# Patient Record
Sex: FEMALE | Race: WHITE | Employment: OTHER | URBAN - METROPOLITAN AREA
[De-identification: names, ages, dates, MRNs, and addresses within clinical notes are randomized per-mention and may not be internally consistent; named-entity substitution may affect disease eponyms.]

---

## 2017-02-24 PROCEDURE — 82746 ASSAY OF FOLIC ACID SERUM: CPT | Performed by: FAMILY MEDICINE

## 2017-02-24 PROCEDURE — 82607 VITAMIN B-12: CPT | Performed by: FAMILY MEDICINE

## 2017-04-13 ENCOUNTER — LAB ENCOUNTER (OUTPATIENT)
Dept: LAB | Age: 82
End: 2017-04-13
Attending: INTERNAL MEDICINE
Payer: MEDICARE

## 2017-04-13 DIAGNOSIS — K74.60 CIRRHOSIS (HCC): Primary | ICD-10-CM

## 2017-04-13 PROCEDURE — 82105 ALPHA-FETOPROTEIN SERUM: CPT

## 2017-06-13 PROCEDURE — 87086 URINE CULTURE/COLONY COUNT: CPT | Performed by: FAMILY MEDICINE

## 2017-06-16 PROCEDURE — 87081 CULTURE SCREEN ONLY: CPT | Performed by: FAMILY MEDICINE

## 2017-07-05 ENCOUNTER — LAB ENCOUNTER (OUTPATIENT)
Dept: LAB | Age: 82
End: 2017-07-05
Attending: ORTHOPAEDIC SURGERY
Payer: MEDICARE

## 2017-07-05 DIAGNOSIS — Z01.818 PRE-OP TESTING: ICD-10-CM

## 2017-07-05 LAB
ANTIBODY SCREEN: NEGATIVE
RH BLOOD TYPE: POSITIVE

## 2017-07-05 PROCEDURE — 86901 BLOOD TYPING SEROLOGIC RH(D): CPT

## 2017-07-05 PROCEDURE — 86900 BLOOD TYPING SEROLOGIC ABO: CPT

## 2017-07-05 PROCEDURE — 36415 COLL VENOUS BLD VENIPUNCTURE: CPT

## 2017-07-05 PROCEDURE — 86850 RBC ANTIBODY SCREEN: CPT

## 2017-07-10 ENCOUNTER — ANESTHESIA (OUTPATIENT)
Dept: SURGERY | Facility: HOSPITAL | Age: 82
DRG: 470 | End: 2017-07-10
Payer: MEDICARE

## 2017-07-10 ENCOUNTER — APPOINTMENT (OUTPATIENT)
Dept: GENERAL RADIOLOGY | Facility: HOSPITAL | Age: 82
DRG: 470 | End: 2017-07-10
Attending: ORTHOPAEDIC SURGERY
Payer: MEDICARE

## 2017-07-10 ENCOUNTER — HOSPITAL ENCOUNTER (INPATIENT)
Facility: HOSPITAL | Age: 82
LOS: 2 days | Discharge: NURSING, REHAB OR LONG-TERM CARE | DRG: 470 | End: 2017-07-12
Attending: ORTHOPAEDIC SURGERY | Admitting: ORTHOPAEDIC SURGERY
Payer: MEDICARE

## 2017-07-10 ENCOUNTER — SURGERY (OUTPATIENT)
Age: 82
End: 2017-07-10

## 2017-07-10 ENCOUNTER — ANESTHESIA EVENT (OUTPATIENT)
Dept: SURGERY | Facility: HOSPITAL | Age: 82
DRG: 470 | End: 2017-07-10
Payer: MEDICARE

## 2017-07-10 DIAGNOSIS — M17.12 PRIMARY OSTEOARTHRITIS OF LEFT KNEE: ICD-10-CM

## 2017-07-10 DIAGNOSIS — Z01.818 PRE-OP TESTING: Primary | ICD-10-CM

## 2017-07-10 PROCEDURE — 97530 THERAPEUTIC ACTIVITIES: CPT

## 2017-07-10 PROCEDURE — 88311 DECALCIFY TISSUE: CPT | Performed by: ORTHOPAEDIC SURGERY

## 2017-07-10 PROCEDURE — C9290 INJ, BUPIVACAINE LIPOSOME: HCPCS | Performed by: ORTHOPAEDIC SURGERY

## 2017-07-10 PROCEDURE — 0SRD0J9 REPLACEMENT OF LEFT KNEE JOINT WITH SYNTHETIC SUBSTITUTE, CEMENTED, OPEN APPROACH: ICD-10-PCS | Performed by: ORTHOPAEDIC SURGERY

## 2017-07-10 PROCEDURE — 97162 PT EVAL MOD COMPLEX 30 MIN: CPT

## 2017-07-10 PROCEDURE — 73560 X-RAY EXAM OF KNEE 1 OR 2: CPT | Performed by: ORTHOPAEDIC SURGERY

## 2017-07-10 PROCEDURE — 88305 TISSUE EXAM BY PATHOLOGIST: CPT | Performed by: ORTHOPAEDIC SURGERY

## 2017-07-10 DEVICE — P.F.C. SIGMA OVAL DOME PATELLA 3-PEG 38MM CEMENTED
Type: IMPLANTABLE DEVICE | Site: KNEE | Status: FUNCTIONAL
Brand: P.F.C. SIGMA

## 2017-07-10 DEVICE — P.F.C. SIGMA TIBIAL TRAY FIXED BEARING MODULAR COCR 2.5 CEMENTED
Type: IMPLANTABLE DEVICE | Site: KNEE | Status: FUNCTIONAL
Brand: P.F.C. SIGMA

## 2017-07-10 DEVICE — DEPUY CMW 1 HIGH VISCOSITY BONE CEMENT 40G: Type: IMPLANTABLE DEVICE | Site: KNEE | Status: FUNCTIONAL

## 2017-07-10 RX ORDER — HYDROMORPHONE HYDROCHLORIDE 1 MG/ML
0.4 INJECTION, SOLUTION INTRAMUSCULAR; INTRAVENOUS; SUBCUTANEOUS EVERY 2 HOUR PRN
Status: DISCONTINUED | OUTPATIENT
Start: 2017-07-10 | End: 2017-07-12

## 2017-07-10 RX ORDER — SODIUM CHLORIDE, SODIUM LACTATE, POTASSIUM CHLORIDE, CALCIUM CHLORIDE 600; 310; 30; 20 MG/100ML; MG/100ML; MG/100ML; MG/100ML
INJECTION, SOLUTION INTRAVENOUS CONTINUOUS
Status: DISCONTINUED | OUTPATIENT
Start: 2017-07-10 | End: 2017-07-12

## 2017-07-10 RX ORDER — HYDROMORPHONE HYDROCHLORIDE 1 MG/ML
0.4 INJECTION, SOLUTION INTRAMUSCULAR; INTRAVENOUS; SUBCUTANEOUS EVERY 5 MIN PRN
Status: DISCONTINUED | OUTPATIENT
Start: 2017-07-10 | End: 2017-07-10 | Stop reason: HOSPADM

## 2017-07-10 RX ORDER — HYDROMORPHONE HYDROCHLORIDE 1 MG/ML
0.6 INJECTION, SOLUTION INTRAMUSCULAR; INTRAVENOUS; SUBCUTANEOUS EVERY 5 MIN PRN
Status: DISCONTINUED | OUTPATIENT
Start: 2017-07-10 | End: 2017-07-10 | Stop reason: HOSPADM

## 2017-07-10 RX ORDER — SODIUM CHLORIDE 9 MG/ML
INJECTION, SOLUTION INTRAVENOUS CONTINUOUS
Status: DISCONTINUED | OUTPATIENT
Start: 2017-07-10 | End: 2017-07-12

## 2017-07-10 RX ORDER — HYDROMORPHONE HYDROCHLORIDE 1 MG/ML
0.8 INJECTION, SOLUTION INTRAMUSCULAR; INTRAVENOUS; SUBCUTANEOUS EVERY 2 HOUR PRN
Status: DISCONTINUED | OUTPATIENT
Start: 2017-07-10 | End: 2017-07-12

## 2017-07-10 RX ORDER — ONDANSETRON 2 MG/ML
4 INJECTION INTRAMUSCULAR; INTRAVENOUS ONCE AS NEEDED
Status: DISCONTINUED | OUTPATIENT
Start: 2017-07-10 | End: 2017-07-10 | Stop reason: HOSPADM

## 2017-07-10 RX ORDER — MULTIPLE VITAMINS W/ MINERALS TAB 9MG-400MCG
1 TAB ORAL DAILY
Status: DISCONTINUED | OUTPATIENT
Start: 2017-07-10 | End: 2017-07-12

## 2017-07-10 RX ORDER — METOCLOPRAMIDE HYDROCHLORIDE 5 MG/ML
10 INJECTION INTRAMUSCULAR; INTRAVENOUS EVERY 6 HOURS PRN
Status: ACTIVE | OUTPATIENT
Start: 2017-07-10 | End: 2017-07-12

## 2017-07-10 RX ORDER — DIPHENHYDRAMINE HYDROCHLORIDE 50 MG/ML
25 INJECTION INTRAMUSCULAR; INTRAVENOUS ONCE AS NEEDED
Status: ACTIVE | OUTPATIENT
Start: 2017-07-10 | End: 2017-07-10

## 2017-07-10 RX ORDER — ROCURONIUM BROMIDE 10 MG/ML
INJECTION, SOLUTION INTRAVENOUS AS NEEDED
Status: DISCONTINUED | OUTPATIENT
Start: 2017-07-10 | End: 2017-07-10 | Stop reason: SURG

## 2017-07-10 RX ORDER — HYDROMORPHONE HYDROCHLORIDE 1 MG/ML
INJECTION, SOLUTION INTRAMUSCULAR; INTRAVENOUS; SUBCUTANEOUS AS NEEDED
Status: DISCONTINUED | OUTPATIENT
Start: 2017-07-10 | End: 2017-07-10 | Stop reason: SURG

## 2017-07-10 RX ORDER — HYDROCODONE BITARTRATE AND ACETAMINOPHEN 7.5; 325 MG/1; MG/1
2 TABLET ORAL EVERY 4 HOURS PRN
Status: DISCONTINUED | OUTPATIENT
Start: 2017-07-10 | End: 2017-07-12

## 2017-07-10 RX ORDER — METOCLOPRAMIDE 10 MG/1
10 TABLET ORAL ONCE
Status: DISCONTINUED | OUTPATIENT
Start: 2017-07-10 | End: 2017-07-10 | Stop reason: HOSPADM

## 2017-07-10 RX ORDER — SODIUM CHLORIDE 0.9 % (FLUSH) 0.9 %
10 SYRINGE (ML) INJECTION AS NEEDED
Status: DISCONTINUED | OUTPATIENT
Start: 2017-07-10 | End: 2017-07-12

## 2017-07-10 RX ORDER — NEOSTIGMINE METHYLSULFATE 0.5 MG/ML
INJECTION INTRAVENOUS AS NEEDED
Status: DISCONTINUED | OUTPATIENT
Start: 2017-07-10 | End: 2017-07-10 | Stop reason: SURG

## 2017-07-10 RX ORDER — FAMOTIDINE 20 MG/1
20 TABLET ORAL ONCE
Status: DISCONTINUED | OUTPATIENT
Start: 2017-07-10 | End: 2017-07-10 | Stop reason: HOSPADM

## 2017-07-10 RX ORDER — POLYETHYLENE GLYCOL 3350 17 G/17G
17 POWDER, FOR SOLUTION ORAL DAILY PRN
Status: DISCONTINUED | OUTPATIENT
Start: 2017-07-10 | End: 2017-07-12

## 2017-07-10 RX ORDER — SCOLOPAMINE TRANSDERMAL SYSTEM 1 MG/1
1 PATCH, EXTENDED RELEASE TRANSDERMAL ONCE
Status: DISCONTINUED | OUTPATIENT
Start: 2017-07-10 | End: 2017-07-12

## 2017-07-10 RX ORDER — SODIUM PHOSPHATE, DIBASIC AND SODIUM PHOSPHATE, MONOBASIC 7; 19 G/133ML; G/133ML
1 ENEMA RECTAL ONCE AS NEEDED
Status: DISCONTINUED | OUTPATIENT
Start: 2017-07-10 | End: 2017-07-12

## 2017-07-10 RX ORDER — DOCUSATE SODIUM 100 MG/1
100 CAPSULE, LIQUID FILLED ORAL 2 TIMES DAILY
Status: DISCONTINUED | OUTPATIENT
Start: 2017-07-10 | End: 2017-07-12

## 2017-07-10 RX ORDER — FAMOTIDINE 20 MG/1
20 TABLET ORAL 2 TIMES DAILY
Status: DISCONTINUED | OUTPATIENT
Start: 2017-07-10 | End: 2017-07-12

## 2017-07-10 RX ORDER — HYDROMORPHONE HYDROCHLORIDE 1 MG/ML
0.2 INJECTION, SOLUTION INTRAMUSCULAR; INTRAVENOUS; SUBCUTANEOUS EVERY 5 MIN PRN
Status: DISCONTINUED | OUTPATIENT
Start: 2017-07-10 | End: 2017-07-10 | Stop reason: HOSPADM

## 2017-07-10 RX ORDER — HYDROCODONE BITARTRATE AND ACETAMINOPHEN 5; 325 MG/1; MG/1
1 TABLET ORAL AS NEEDED
Status: DISCONTINUED | OUTPATIENT
Start: 2017-07-10 | End: 2017-07-10 | Stop reason: HOSPADM

## 2017-07-10 RX ORDER — ACETAMINOPHEN 325 MG/1
650 TABLET ORAL ONCE
Status: DISCONTINUED | OUTPATIENT
Start: 2017-07-10 | End: 2017-07-10

## 2017-07-10 RX ORDER — ONDANSETRON 2 MG/ML
4 INJECTION INTRAMUSCULAR; INTRAVENOUS EVERY 4 HOURS PRN
Status: DISCONTINUED | OUTPATIENT
Start: 2017-07-10 | End: 2017-07-12

## 2017-07-10 RX ORDER — DIPHENHYDRAMINE HYDROCHLORIDE 50 MG/ML
12.5 INJECTION INTRAMUSCULAR; INTRAVENOUS EVERY 4 HOURS PRN
Status: DISCONTINUED | OUTPATIENT
Start: 2017-07-10 | End: 2017-07-12

## 2017-07-10 RX ORDER — METOPROLOL SUCCINATE 25 MG/1
25 TABLET, EXTENDED RELEASE ORAL DAILY
Status: DISCONTINUED | OUTPATIENT
Start: 2017-07-10 | End: 2017-07-12

## 2017-07-10 RX ORDER — CYCLOBENZAPRINE HCL 10 MG
10 TABLET ORAL EVERY 8 HOURS PRN
Status: DISCONTINUED | OUTPATIENT
Start: 2017-07-10 | End: 2017-07-12

## 2017-07-10 RX ORDER — HYDROCODONE BITARTRATE AND ACETAMINOPHEN 7.5; 325 MG/1; MG/1
1 TABLET ORAL EVERY 4 HOURS PRN
Status: DISCONTINUED | OUTPATIENT
Start: 2017-07-10 | End: 2017-07-12

## 2017-07-10 RX ORDER — ACETAMINOPHEN 500 MG
1000 TABLET ORAL ONCE
Status: COMPLETED | OUTPATIENT
Start: 2017-07-10 | End: 2017-07-10

## 2017-07-10 RX ORDER — BISACODYL 10 MG
10 SUPPOSITORY, RECTAL RECTAL
Status: DISCONTINUED | OUTPATIENT
Start: 2017-07-10 | End: 2017-07-12

## 2017-07-10 RX ORDER — ACETAMINOPHEN 325 MG/1
650 TABLET ORAL EVERY 4 HOURS PRN
Status: DISCONTINUED | OUTPATIENT
Start: 2017-07-10 | End: 2017-07-12

## 2017-07-10 RX ORDER — HALOPERIDOL 5 MG/ML
0.25 INJECTION INTRAMUSCULAR ONCE AS NEEDED
Status: DISCONTINUED | OUTPATIENT
Start: 2017-07-10 | End: 2017-07-10 | Stop reason: HOSPADM

## 2017-07-10 RX ORDER — OYSTER SHELL CALCIUM WITH VITAMIN D 500; 200 MG/1; [IU]/1
2 TABLET, FILM COATED ORAL DAILY
Status: DISCONTINUED | OUTPATIENT
Start: 2017-07-10 | End: 2017-07-12

## 2017-07-10 RX ORDER — FAMOTIDINE 10 MG/ML
20 INJECTION, SOLUTION INTRAVENOUS 2 TIMES DAILY
Status: DISCONTINUED | OUTPATIENT
Start: 2017-07-10 | End: 2017-07-12

## 2017-07-10 RX ORDER — DIPHENHYDRAMINE HCL 25 MG
25 CAPSULE ORAL EVERY 4 HOURS PRN
Status: DISCONTINUED | OUTPATIENT
Start: 2017-07-10 | End: 2017-07-12

## 2017-07-10 RX ORDER — HYDROCODONE BITARTRATE AND ACETAMINOPHEN 5; 325 MG/1; MG/1
2 TABLET ORAL AS NEEDED
Status: DISCONTINUED | OUTPATIENT
Start: 2017-07-10 | End: 2017-07-10 | Stop reason: HOSPADM

## 2017-07-10 RX ORDER — METOPROLOL TARTRATE 5 MG/5ML
2.5 INJECTION INTRAVENOUS ONCE
Status: DISCONTINUED | OUTPATIENT
Start: 2017-07-10 | End: 2017-07-10 | Stop reason: HOSPADM

## 2017-07-10 RX ORDER — MAGNESIUM HYDROXIDE 1200 MG/15ML
LIQUID ORAL CONTINUOUS PRN
Status: DISCONTINUED | OUTPATIENT
Start: 2017-07-10 | End: 2017-07-10

## 2017-07-10 RX ORDER — DEXAMETHASONE SODIUM PHOSPHATE 4 MG/ML
VIAL (ML) INJECTION AS NEEDED
Status: DISCONTINUED | OUTPATIENT
Start: 2017-07-10 | End: 2017-07-10 | Stop reason: SURG

## 2017-07-10 RX ORDER — HYDROMORPHONE HYDROCHLORIDE 1 MG/ML
0.2 INJECTION, SOLUTION INTRAMUSCULAR; INTRAVENOUS; SUBCUTANEOUS EVERY 2 HOUR PRN
Status: DISCONTINUED | OUTPATIENT
Start: 2017-07-10 | End: 2017-07-12

## 2017-07-10 RX ORDER — SODIUM CHLORIDE, SODIUM LACTATE, POTASSIUM CHLORIDE, CALCIUM CHLORIDE 600; 310; 30; 20 MG/100ML; MG/100ML; MG/100ML; MG/100ML
INJECTION, SOLUTION INTRAVENOUS CONTINUOUS PRN
Status: DISCONTINUED | OUTPATIENT
Start: 2017-07-10 | End: 2017-07-10 | Stop reason: SURG

## 2017-07-10 RX ORDER — MORPHINE SULFATE 2 MG/ML
2 INJECTION, SOLUTION INTRAMUSCULAR; INTRAVENOUS EVERY 10 MIN PRN
Status: DISCONTINUED | OUTPATIENT
Start: 2017-07-10 | End: 2017-07-10 | Stop reason: HOSPADM

## 2017-07-10 RX ORDER — ASPIRIN 325 MG
325 TABLET ORAL DAILY
Status: DISCONTINUED | OUTPATIENT
Start: 2017-07-11 | End: 2017-07-12

## 2017-07-10 RX ORDER — ONDANSETRON 2 MG/ML
INJECTION INTRAMUSCULAR; INTRAVENOUS AS NEEDED
Status: DISCONTINUED | OUTPATIENT
Start: 2017-07-10 | End: 2017-07-10 | Stop reason: SURG

## 2017-07-10 RX ORDER — MORPHINE SULFATE 4 MG/ML
6 INJECTION, SOLUTION INTRAMUSCULAR; INTRAVENOUS EVERY 10 MIN PRN
Status: DISCONTINUED | OUTPATIENT
Start: 2017-07-10 | End: 2017-07-10 | Stop reason: HOSPADM

## 2017-07-10 RX ORDER — NALOXONE HYDROCHLORIDE 0.4 MG/ML
80 INJECTION, SOLUTION INTRAMUSCULAR; INTRAVENOUS; SUBCUTANEOUS AS NEEDED
Status: DISCONTINUED | OUTPATIENT
Start: 2017-07-10 | End: 2017-07-10 | Stop reason: HOSPADM

## 2017-07-10 RX ORDER — MORPHINE SULFATE 4 MG/ML
4 INJECTION, SOLUTION INTRAMUSCULAR; INTRAVENOUS EVERY 10 MIN PRN
Status: DISCONTINUED | OUTPATIENT
Start: 2017-07-10 | End: 2017-07-10 | Stop reason: HOSPADM

## 2017-07-10 RX ORDER — GABAPENTIN 300 MG/1
600 CAPSULE ORAL ONCE
Status: COMPLETED | OUTPATIENT
Start: 2017-07-10 | End: 2017-07-10

## 2017-07-10 RX ORDER — GLYCOPYRROLATE 0.2 MG/ML
INJECTION INTRAMUSCULAR; INTRAVENOUS AS NEEDED
Status: DISCONTINUED | OUTPATIENT
Start: 2017-07-10 | End: 2017-07-10 | Stop reason: SURG

## 2017-07-10 RX ORDER — LIDOCAINE HYDROCHLORIDE 10 MG/ML
INJECTION, SOLUTION EPIDURAL; INFILTRATION; INTRACAUDAL; PERINEURAL AS NEEDED
Status: DISCONTINUED | OUTPATIENT
Start: 2017-07-10 | End: 2017-07-10 | Stop reason: SURG

## 2017-07-10 RX ADMIN — LIDOCAINE HYDROCHLORIDE 40 MG: 10 INJECTION, SOLUTION EPIDURAL; INFILTRATION; INTRACAUDAL; PERINEURAL at 09:48:00

## 2017-07-10 RX ADMIN — GLYCOPYRROLATE 0.5 MG: 0.2 INJECTION INTRAMUSCULAR; INTRAVENOUS at 11:15:00

## 2017-07-10 RX ADMIN — SODIUM CHLORIDE, SODIUM LACTATE, POTASSIUM CHLORIDE, CALCIUM CHLORIDE: 600; 310; 30; 20 INJECTION, SOLUTION INTRAVENOUS at 09:45:00

## 2017-07-10 RX ADMIN — NEOSTIGMINE METHYLSULFATE 3 MG: 0.5 INJECTION INTRAVENOUS at 11:15:00

## 2017-07-10 RX ADMIN — HYDROMORPHONE HYDROCHLORIDE 0.2 MG: 1 INJECTION, SOLUTION INTRAMUSCULAR; INTRAVENOUS; SUBCUTANEOUS at 10:38:00

## 2017-07-10 RX ADMIN — ROCURONIUM BROMIDE 35 MG: 10 INJECTION, SOLUTION INTRAVENOUS at 09:49:00

## 2017-07-10 RX ADMIN — DEXAMETHASONE SODIUM PHOSPHATE 4 MG: 4 MG/ML VIAL (ML) INJECTION at 10:03:00

## 2017-07-10 RX ADMIN — HYDROMORPHONE HYDROCHLORIDE 0.2 MG: 1 INJECTION, SOLUTION INTRAMUSCULAR; INTRAVENOUS; SUBCUTANEOUS at 10:28:00

## 2017-07-10 RX ADMIN — ONDANSETRON 4 MG: 2 INJECTION INTRAMUSCULAR; INTRAVENOUS at 10:54:00

## 2017-07-10 RX ADMIN — SODIUM CHLORIDE, SODIUM LACTATE, POTASSIUM CHLORIDE, CALCIUM CHLORIDE: 600; 310; 30; 20 INJECTION, SOLUTION INTRAVENOUS at 11:21:00

## 2017-07-10 RX ADMIN — HYDROMORPHONE HYDROCHLORIDE 0.4 MG: 1 INJECTION, SOLUTION INTRAMUSCULAR; INTRAVENOUS; SUBCUTANEOUS at 09:59:00

## 2017-07-10 RX ADMIN — HYDROMORPHONE HYDROCHLORIDE 0.2 MG: 1 INJECTION, SOLUTION INTRAMUSCULAR; INTRAVENOUS; SUBCUTANEOUS at 11:18:00

## 2017-07-10 NOTE — INTERVAL H&P NOTE
Pre-op Diagnosis: Left knee osteoarthritis    The above referenced H&P was reviewed by Eladio Pacheco MD on 7/10/2017, the patient was examined and no significant changes have occurred in the patient's condition since the H&P was performed.   I discussed with

## 2017-07-10 NOTE — H&P
[address] [facility] [address] Patient Status:  Surgery Admit    1935 MRN [id number]   Location [place] [address] Attending Gardenia Lai MD   Hosp Day # 0 PCP Sherryle Rhein, DO     Subjective:  Patient is admitted for left total kn [facility])     left leg 2011   • GERD without esophagitis    • History of blood transfusion     anemia    • Hypercholesterolemia    • Hypertension    • Iron deficiency anemia    • Lumbar stenosis    • Osteoarthritis involving multiple joints on both sides of luis and imaging studies are consistent with advanced degenerative joint disease of the left knee. The treatment options including medical management, injection therapy arthroscopy and arthroplasty were discussed at length.  The risks and benefits of total knee

## 2017-07-10 NOTE — OPERATIVE REPORT
Date of Surgery:  07/10/2017  Surgeon: Margarita Valderrama MD  Anesthesia Type:  General  Pre-Op Diagnosis:     (1) Unilateral primary osteoarthritis, left knee  Post-Op Diagnosis:     (1) Unilateral primary osteoarthritis, left knee  Procedure Performed  left TKA cutting guide, drilled it and pinned it in place and then resected the distal femur, sizing it to a 2.5. We then placed the four-in-one cutting guide and made our anterior cut, posterior cut, chamfer cut. We then turned our attention to the tibia.   The nusrat 25

## 2017-07-10 NOTE — ANESTHESIA POSTPROCEDURE EVALUATION
Patient: Steve Jerez    Procedure Summary     Date:  07/10/17 Room / Location:  [address] [place] [address] MAIN OR 11 / [address] [place] [address] MAIN OR    Anesthesia Start:  0945 Anesthesia Stop:  7121    Procedure:  KNEE TOTAL REPLACEMENT (Left Knee) Diagnosis:  (Left knee osteoarthritis)    Surg

## 2017-07-10 NOTE — ANESTHESIA PREPROCEDURE EVALUATION
Anesthesia PreOp Note    HPI:     Chantal Mortimer is a 80year old female who presents for preoperative consultation requested by: Willie Gillespie MD    Date of Surgery: 7/10/2017    Procedure(s):  KNEE TOTAL REPLACEMENT  Indication: Left knee osteoarthritis JOINT UNLISTED      Comment: left knee  2001: CATARACT Bilateral  2001: CATARACT EXTRACTION W/ INTRAOCULAR LENS  IMPLA*  No date: HYSTERECTOMY  2008: RELIEVE BLADDER RETENTION      Comment: bladder suspension  2003: REPAIR RETINAL DETACH W VITRECTOMY - OD MG/ML 30 mg, morphINE Sulfate (PF) 5 mg in sodium chloride 0.9 % syringe  Intra-articular Once Nolvia Dejesus MD      No current [facility]-ordered outpatient prescriptions on file.       Codeine                 Dizziness  Niacin                  Other (See Comment dentures and lower dentures    Pulmonary - negative ROS and normal exam   Cardiovascular - normal exam  (+) hypertension,     Neuro/Psych - negative ROS     GI/Hepatic/Renal    (+) GERD, liver disease,     Endo/Other    Abdominal  - normal exam

## 2017-07-10 NOTE — DISCHARGE PLANNING
EMILIE met with the pt. And her children At bedside. The pt. Lives alone at [facility]. The pt. Reports being independent prior to admission with adls and ambulation. The pt. Goes to the dining room for her meals. The pt.  Has 4 children,

## 2017-07-10 NOTE — PLAN OF CARE
PT STATED THAT HAS HX GASTRIC BLEED WITH ELIQUIS. DR. Margo Velez CONTACTED AND KWADWOQULANRE DC'D AND PT WILL BE ON ASA BID.

## 2017-07-10 NOTE — PHYSICAL THERAPY NOTE
PHYSICAL THERAPY KNEE EVALUATION - INPATIENT     Room Number: 424/424-A  Evaluation Date: 7/10/2017  Type of Evaluation: Initial  Physician Order: PT Eval and Treat    Presenting Problem: L TKA  Reason for Therapy: Mobility Dysfunction and Discharge [place] mobility;Transfer training;Gait training;Strengthening;Patient education; Family education  Rehab Potential : Fair  Frequency (Obs): BID       PHYSICAL THERAPY MEDICAL/SOCIAL HISTORY     History related to current admission: Per H&P: \"Patient is admitted f Drives: No (Son or daughter drives)          Prior Level of [place]: Pt was independent w/ ADLs and amb w/o AD. Pt down to dining room for meals.      SUBJECTIVE  \"Oh Mommy!!\"    PHYSICAL THERAPY EXAMINATION     OBJECTIVE     Fall Risk: High fall r pumps  Transfer training    Patient End of Session: Up in chair;Needs met;Call light within reach;RN aware of session/findings; All patient questions and concerns addressed; Family present    CURRENT GOALS    Goals to be met by: 07/24/17  Patient Goal Jg

## 2017-07-11 LAB
ANION GAP SERPL CALC-SCNC: 5 MMOL/L (ref 0–18)
BUN SERPL-MCNC: 28 MG/DL (ref 8–20)
BUN/CREAT SERPL: 34.1 (ref 10–20)
CALCIUM SERPL-MCNC: 8.2 MG/DL (ref 8.5–10.5)
CHLORIDE SERPL-SCNC: 107 MMOL/L (ref 95–110)
CO2 SERPL-SCNC: 26 MMOL/L (ref 22–32)
CREAT SERPL-MCNC: 0.82 MG/DL (ref 0.5–1.5)
ERYTHROCYTE [DISTWIDTH] IN BLOOD BY AUTOMATED COUNT: 13.3 % (ref 11–15)
GLUCOSE SERPL-MCNC: 112 MG/DL (ref 70–99)
HCT VFR BLD AUTO: 30.4 % (ref 35–48)
HGB BLD-MCNC: 10.4 G/DL (ref 12–16)
MCH RBC QN AUTO: 32.6 PG (ref 27–32)
MCHC RBC AUTO-ENTMCNC: 34.2 G/DL (ref 32–37)
MCV RBC AUTO: 95.3 FL (ref 80–100)
OSMOLALITY UR CALC.SUM OF ELEC: 292 MOSM/KG (ref 275–295)
PLATELET # BLD AUTO: 169 K/UL (ref 140–400)
PMV BLD AUTO: 8.5 FL (ref 7.4–10.3)
POTASSIUM SERPL-SCNC: 4.8 MMOL/L (ref 3.3–5.1)
RBC # BLD AUTO: 3.19 M/UL (ref 3.7–5.4)
SODIUM SERPL-SCNC: 138 MMOL/L (ref 136–144)
WBC # BLD AUTO: 10 K/UL (ref 4–11)

## 2017-07-11 PROCEDURE — 97165 OT EVAL LOW COMPLEX 30 MIN: CPT

## 2017-07-11 PROCEDURE — 97535 SELF CARE MNGMENT TRAINING: CPT

## 2017-07-11 PROCEDURE — 85027 COMPLETE CBC AUTOMATED: CPT | Performed by: ORTHOPAEDIC SURGERY

## 2017-07-11 PROCEDURE — 80048 BASIC METABOLIC PNL TOTAL CA: CPT | Performed by: ORTHOPAEDIC SURGERY

## 2017-07-11 PROCEDURE — 97116 GAIT TRAINING THERAPY: CPT

## 2017-07-11 PROCEDURE — 97110 THERAPEUTIC EXERCISES: CPT

## 2017-07-11 NOTE — PROGRESS NOTES
[facility]    Progress Note    Luis Manuel Lazo Patient Status:  Inpatient    1935 MRN [id number]   Location [facility] 4W/SW/SE Attending Brian Davis MD   [facility] Day # 1 PCP Erika Velazquez DO       Subjective:   Elizabetrojas Clifton is a( 06/13/2017   TP 7.1 06/13/2017   AST 20 06/13/2017   ALT 20 06/13/2017   PTT 23.6 (L) 06/13/2017   INR 1.0 06/13/2017   T4F 1.06 02/24/2017   TSH 5.131 06/13/2017   SISI 89 07/19/2016    07/19/2016   CRP 0.23 06/13/2017   MG 1.6 (L) 07/19/2016   B12

## 2017-07-11 NOTE — PLAN OF CARE
DISCHARGE PLANNING    • Discharge to home or other facility with appropriate resources Progressing        GASTROINTESTINAL - ADULT    • Minimal or absence of nausea and vomiting Progressing        MUSCULOSKELETAL - ADULT    • Return mobility to safest leve

## 2017-07-11 NOTE — CM/SW NOTE
CTL visit:  Pt states had been nauseated and vomited during the night. Latest PT eval indicated that pt is not safe to go home and would need KIMBERLY. Plan is for pt to go to [address] [place] [address] Way upon discharge.

## 2017-07-11 NOTE — OCCUPATIONAL THERAPY NOTE
OCCUPATIONAL THERAPY EVALUATION - INPATIENT      Room Number: 424/424-A  Evaluation Date: 7/11/2017  Type of Evaluation: Initial  Presenting Problem:  (L TKA)    Physician Order: IP Consult to Occupational Therapy  Reason for Therapy: ADL/IADL Dysfunction date:  HYSTERECTOMY  2008: RELIEVE BLADDER RETENTION      Comment: bladder suspension  2003: REPAIR RETINAL DETACH W VITRECTOMY - OD - RIGH*  1974: TOTAL ABDOMINAL HYSTERECTOMY      Comment: fibroids  No date: TOTAL KNEE REPLACEMENT Right    HOME SITUATION toilet seat  Shower Transfer: Min  Chair Transfer: Min   Car Transfer: Min    Bedroom Mobility: Min      FUNCTIONAL ADL ASSESSMENT  Grooming: Min - standing at sink to wash hands  Bathing: Not completed  Toileting: Supervision - urinated  Upper Body Trudell Half

## 2017-07-11 NOTE — PLAN OF CARE
DISCHARGE PLANNING    • Discharge to home or other facility with appropriate resources Progressing    Plan to d/c to rehab when cleared.      GASTROINTESTINAL - ADULT    • Minimal or absence of nausea and vomiting Progressing    Zofran given once this shift

## 2017-07-11 NOTE — PHYSICAL THERAPY NOTE
PHYSICAL THERAPY KNEE TREATMENT NOTE - INPATIENT     Room Number: 424/424-A             Presenting Problem: L TKA    Problem List  Active Problems:    Pre-op testing      ASSESSMENT   Pt was see bID toda for therapy activty, RN approved sessions.  Pt Min A Impairment Score: 50.57%   Standardized Score (AM-PAC Scale): 42.13   CMS Modifier (G-Code): CK    FUNCTIONAL ABILITY STATUS  Gait Assessment   Gait Assistance: Minimum assistance  Distance (ft): 30 x 2  Assistive Device: Rolling walker  Pattern: L [place]

## 2017-07-11 NOTE — H&P
[facility]    PATIENT'S NAME: [facility], NAE BILL   ATTENDING PHYSICIAN: Michel Maki.  Velma Beckham MD   PATIENT ACCOUNT#:   [id number]    LOCATION:  [address] [place] [address] RECORD #:   [id number]       YOB: 1935  ADMISSION DATE:       07/10/2017 equally reactive, round to light. Extraocular movements were intact. Mucosa was moist.  Upper and lower dentures were noted. NECK:  No masses. LUNGS:  Clear to auscultation percussion. HEART:  Regular rate and rhythm S1, S2. No murmurs.   EXTREMITIES:

## 2017-07-11 NOTE — PLAN OF CARE
GASTROINTESTINAL - ADULT    • Minimal or absence of nausea and vomiting Not Progressing    PT C/O NAUSEA AND WAS DRY -HEAVING. ZOFRAN GIVEN. DR CHAVEZ PAGED FOR RECONCILIATION HOME MEDICATIONS URSODIOL-AWAITING CALL BACK.  CLD FOR NOW AND WILL ADVANCE AS

## 2017-07-12 VITALS
SYSTOLIC BLOOD PRESSURE: 151 MMHG | HEART RATE: 87 BPM | RESPIRATION RATE: 20 BRPM | DIASTOLIC BLOOD PRESSURE: 50 MMHG | WEIGHT: 145 LBS | BODY MASS INDEX: 24.75 KG/M2 | OXYGEN SATURATION: 97 % | HEIGHT: 64 IN | TEMPERATURE: 99 F

## 2017-07-12 LAB
ERYTHROCYTE [DISTWIDTH] IN BLOOD BY AUTOMATED COUNT: 13.4 % (ref 11–15)
HCT VFR BLD AUTO: 29.9 % (ref 35–48)
HGB BLD-MCNC: 10.1 G/DL (ref 12–16)
MCH RBC QN AUTO: 32.4 PG (ref 27–32)
MCHC RBC AUTO-ENTMCNC: 33.8 G/DL (ref 32–37)
MCV RBC AUTO: 96 FL (ref 80–100)
PLATELET # BLD AUTO: 147 K/UL (ref 140–400)
PMV BLD AUTO: 9.3 FL (ref 7.4–10.3)
RBC # BLD AUTO: 3.12 M/UL (ref 3.7–5.4)
WBC # BLD AUTO: 9.5 K/UL (ref 4–11)

## 2017-07-12 PROCEDURE — 97116 GAIT TRAINING THERAPY: CPT

## 2017-07-12 PROCEDURE — 97110 THERAPEUTIC EXERCISES: CPT

## 2017-07-12 PROCEDURE — 85027 COMPLETE CBC AUTOMATED: CPT | Performed by: ORTHOPAEDIC SURGERY

## 2017-07-12 PROCEDURE — 97535 SELF CARE MNGMENT TRAINING: CPT

## 2017-07-12 RX ORDER — ASPIRIN 325 MG
325 TABLET ORAL 2 TIMES DAILY WITH MEALS
Qty: 70 TABLET | Refills: 0 | Status: SHIPPED | OUTPATIENT
Start: 2017-07-12 | End: 2017-08-28 | Stop reason: ALTCHOICE

## 2017-07-12 RX ORDER — HYDROCODONE BITARTRATE AND ACETAMINOPHEN 7.5; 325 MG/1; MG/1
1 TABLET ORAL EVERY 6 HOURS PRN
Qty: 60 TABLET | Refills: 0 | Status: SHIPPED | OUTPATIENT
Start: 2017-07-12 | End: 2017-10-02 | Stop reason: ALTCHOICE

## 2017-07-12 RX ORDER — FAMOTIDINE 20 MG/1
20 TABLET ORAL 2 TIMES DAILY
Qty: 70 TABLET | Refills: 0 | Status: SHIPPED | OUTPATIENT
Start: 2017-07-12 | End: 2017-07-31 | Stop reason: ALTCHOICE

## 2017-07-12 NOTE — PHYSICAL THERAPY NOTE
PHYSICAL THERAPY KNEE TREATMENT NOTE - INPATIENT     Room Number: 424/424-A             Presenting Problem: L TKA    Problem List  Active Problems:    Pre-op testing      ASSESSMENT   Pt was see BID toda for therapy activty, RN approved sessions.  Pt Min A railing?: A Lot    AM-PAC Score:  Raw Score: 17   PT Approx Degree of Impairment Score: 50.57%   Standardized Score (AM-PAC Scale): 42.13   CMS Modifier (G-Code): CK    FUNCTIONAL ABILITY STATUS  Gait Assessment   Gait Assistance: Minimum assistance  Dista

## 2017-07-12 NOTE — OCCUPATIONAL THERAPY NOTE
OCCUPATIONAL THERAPY TREATMENT NOTE - INPATIENT     Room Number: 424/424-A         Presenting Problem:  (L TKA)    Problem List  Active Problems:    Pre-op testing      ASSESSMENT   Pt seen up in chair and practiced LE dressing with set up and min assist f None  -   Taking care of personal grooming such as brushing teeth?: None  -   Eating meals?: None    AM-PAC Score:  Score: 22  Approx Degree of Impairment: 25.8%  Standardized Score (AM-PAC Scale): 47.1  CMS Modifier (G-Code): ALLISON    FUNCTIONAL TRANSFER ASS

## 2017-07-12 NOTE — PLAN OF CARE
Report called to [facility], given to [place]. All questions answered. Norco script in chart to be sent over with patient.

## 2017-07-12 NOTE — DISCHARGE PLANNING
The pt. Is scheduled to discharge to [facility] 7/12 at 2p, via [address] [facility]. The pt. Can discharge on day 2 as she is a part of the [place] bundle. The pt. And her family are aware of the above and medicar costs.       Report [phone]     Th

## 2017-07-12 NOTE — PROGRESS NOTES
[facility]    Progress Note    Florian Gracia Patient Status:  Inpatient    1935 MRN [id number]   Location [facility] 4W/SW/SE Attending Edith Mata MD   [facility] Day # 2 PCP Natalie Baum DO       Subjective:   Florian Gracia is a( 325 mg twice daily ×5 weeks. Results:     Lab Results  Component Value Date   WBC 9.5 07/12/2017   HGB 10.1 (L) 07/12/2017   HCT 29.9 (L) 07/12/2017    07/12/2017   CREATSERUM 0.82 07/11/2017   BUN 28 (H) 07/11/2017    07/11/2017   K 4.

## 2017-07-12 NOTE — PLAN OF CARE
DISCHARGE PLANNING    • Discharge to home or other facility with appropriate resources Completed        GASTROINTESTINAL - ADULT    • Minimal or absence of nausea and vomiting Completed        MUSCULOSKELETAL - ADULT    • Return mobility to safest level of

## 2017-07-12 NOTE — CM/SW NOTE
Pt appeared in Episode connect as an [place] initiator bunde. Pt is a pt of Dr. Paris Goldberg and is a [place] [facility] bundle. Pt was discharged to [facility].

## 2017-07-12 NOTE — PROGRESS NOTES
90 [facility] Patient Status:  Inpatient    1935 MRN [id number]   Location [facility] 4W/SW/SE Attending Abeba Tony MD   [address] Day # 2 PCP Rajinder Cevallos DO     Subjective:  Post-Operative Day: 2 Status Post left Total Kn diphenhydrAMINE (BENADRYL) cap/tab 25 mg 25 mg Oral Q4H PRN   Or      DiphenhydrAMINE HCl (BENADRYL) injection 12.5 mg 12.5 mg Intravenous Q4H PRN   acetaminophen (TYLENOL) tab 650 mg 650 mg Oral Q4H PRN   Or      hydrocodone-acetaminophen (NORCO) 7.5-32

## 2017-07-22 NOTE — DISCHARGE SUMMARY
[facility]    PATIENT'S NAME: [facility], NAE KHAN   ATTENDING PHYSICIAN: Jacob Astudillo.  Makeda Archer MD   PATIENT ACCOUNT#:   [id number]    LOCATION:  [address] [place] [address] RECORD #:   [id number]       YOB: 1935  ADMISSION DATE:       07/10/2017

## 2017-10-24 ENCOUNTER — LAB ENCOUNTER (OUTPATIENT)
Dept: LAB | Age: 82
End: 2017-10-24
Attending: INTERNAL MEDICINE
Payer: MEDICARE

## 2017-10-24 DIAGNOSIS — K74.60 CIRRHOSIS (HCC): Primary | ICD-10-CM

## 2017-10-24 PROCEDURE — 80053 COMPREHEN METABOLIC PANEL: CPT

## 2017-10-24 PROCEDURE — 82105 ALPHA-FETOPROTEIN SERUM: CPT

## 2017-10-24 PROCEDURE — 85025 COMPLETE CBC W/AUTO DIFF WBC: CPT

## 2018-04-10 ENCOUNTER — LAB ENCOUNTER (OUTPATIENT)
Dept: LAB | Age: 83
End: 2018-04-10
Attending: INTERNAL MEDICINE
Payer: MEDICARE

## 2018-04-10 DIAGNOSIS — K74.60 CIRRHOSIS (HCC): Primary | ICD-10-CM

## 2018-04-10 PROCEDURE — 82105 ALPHA-FETOPROTEIN SERUM: CPT

## 2018-04-10 PROCEDURE — 85025 COMPLETE CBC W/AUTO DIFF WBC: CPT

## 2018-04-10 PROCEDURE — 80053 COMPREHEN METABOLIC PANEL: CPT

## 2018-04-16 PROBLEM — N32.81 OVERACTIVE BLADDER: Status: ACTIVE | Noted: 2018-04-16

## 2018-10-10 ENCOUNTER — LAB ENCOUNTER (OUTPATIENT)
Dept: LAB | Age: 83
End: 2018-10-10
Attending: INTERNAL MEDICINE
Payer: MEDICARE

## 2018-10-10 DIAGNOSIS — K76.6 PORTAL HYPERTENSION (HCC): ICD-10-CM

## 2018-10-10 DIAGNOSIS — K74.60 CIRRHOSIS (HCC): Primary | ICD-10-CM

## 2018-10-10 PROCEDURE — 80053 COMPREHEN METABOLIC PANEL: CPT

## 2018-10-10 PROCEDURE — 82105 ALPHA-FETOPROTEIN SERUM: CPT

## 2018-10-10 PROCEDURE — 85025 COMPLETE CBC W/AUTO DIFF WBC: CPT

## 2019-05-08 ENCOUNTER — LAB ENCOUNTER (OUTPATIENT)
Dept: LAB | Age: 84
End: 2019-05-08
Attending: INTERNAL MEDICINE
Payer: MEDICARE

## 2019-05-08 DIAGNOSIS — K74.60 CIRRHOSIS OF LIVER (HCC): Primary | ICD-10-CM

## 2019-05-08 DIAGNOSIS — K76.6 PORTAL HYPERTENSION (HCC): ICD-10-CM

## 2019-05-08 PROCEDURE — 85025 COMPLETE CBC W/AUTO DIFF WBC: CPT

## 2019-05-08 PROCEDURE — 80053 COMPREHEN METABOLIC PANEL: CPT

## 2019-05-08 PROCEDURE — 82105 ALPHA-FETOPROTEIN SERUM: CPT

## 2020-05-14 PROBLEM — C34.92 ADENOCARCINOMA, LUNG, LEFT (HCC): Status: ACTIVE | Noted: 2020-05-14

## 2020-12-01 PROBLEM — R79.89 ELEVATED TSH: Status: ACTIVE | Noted: 2020-12-01

## 2020-12-01 PROBLEM — I48.0 PAROXYSMAL ATRIAL FIBRILLATION (HCC): Status: ACTIVE | Noted: 2020-12-01

## (undated) DEVICE — 20 ML SYRINGE LUER-LOCK TIP: Brand: MONOJECT

## (undated) DEVICE — ZIMMER® STERILE DISPOSABLE TOURNIQUET CUFF WITH PLC, DUAL PORT, SINGLE BLADDER, 30 IN. (76 CM)

## (undated) DEVICE — Device: Brand: JELCO

## (undated) DEVICE — DERMABOND LIQUID ADHESIVE

## (undated) DEVICE — PROXIMATE SKIN STAPLERS (35 WIDE) CONTAINS 35 STAINLESS STEEL STAPLES (FIXED HEAD): Brand: PROXIMATE

## (undated) DEVICE — TRAY SRGPRP PVP IOD WT SCRB SM

## (undated) DEVICE — BATTERY

## (undated) DEVICE — SOL  .9 1000ML BTL

## (undated) DEVICE — FAN SPRAY KIT: Brand: PULSAVAC®

## (undated) DEVICE — STERILE LATEX POWDER-FREE SURGICAL GLOVESWITH NITRILE COATING: Brand: PROTEXIS

## (undated) DEVICE — BLADE SAW SAGITTAL 19.5

## (undated) DEVICE — BLADE 77.5X11.2MM RECIP SRG

## (undated) DEVICE — POUCH: SSEAL TYVEK 2000/CS: Brand: MEDICAL ACTION INDUSTRIES

## (undated) DEVICE — CHLORAPREP 26ML APPLICATOR

## (undated) DEVICE — TOTAL KNEE: Brand: MEDLINE INDUSTRIES, INC.

## (undated) DEVICE — TRUMATCH PIN GUIDE SET FOR USE WITH: LEFT: Brand: TRUMATCH

## (undated) DEVICE — T5 HOOD WITH PEEL AWAY FACE SHIELD

## (undated) DEVICE — Device

## (undated) DEVICE — Device: Brand: POWER-FLO®

## (undated) DEVICE — DUAL CUT SAGITTAL BLADE

## (undated) DEVICE — BIPOLAR SEALER 23-112-1 AQM 6.0: Brand: AQUAMANTYS™

## (undated) DEVICE — GAUZE SPONGES,12 PLY: Brand: CURITY

## (undated) DEVICE — SOL  .9 3000ML

## (undated) DEVICE — DRESSING SILVERLON ISLAND 13IN

## (undated) DEVICE — BANDAGE ROLL,100% COTTON, 6 PLY, LARGE: Brand: KERLIX

## (undated) DEVICE — SUTURE MONOCRYL 2-0 SH

## (undated) DEVICE — SOL  .9 1000ML BAG

## (undated) DEVICE — DRAPE SHEET LG

## (undated) DEVICE — CONTAINER SPEC STR 4OZ GRY LID

## (undated) DEVICE — GOWN SURG AERO BLUE PERF XLG

## (undated) DEVICE — NEEDLE HPO 18GA 1.5IN ECLPS

## (undated) DEVICE — SUTURE PDS II 2-0 CP

## (undated) NOTE — IP AVS SNAPSHOT
Patient Demographics     Address  [address] [place] [address] Phone  [phone] [facility]) *Preferred*  [phone] [facility]) E-mail Address  [email or web address]      Emergency Contact(s)     Name Relation Home Work Mobile    Christianne New For knee replacements, the incision can be open to air. If there is any leakage, a bandage may be placed on the incision with an ACE wrap on the knee for compression. Showering is allowed five days after your surgery.  You should keep the incision covered lying down. You may lie on the floor and place your foot up on the couch to get the leg high enough. For hip patients, elevation may include your ankle to thigh. Compression in the form of an ACE wrap around your knee can also decrease swelling.  You may us FAQ  1. When can I drive? Every patient tolerates and recovers from surgery differently; therefore we cannot give you an exact timeframe.   You need to have excellent leg control and strength and you must be completely free of taking na precautions. If you have any specific questions, please ask Dr. Nidia Paredes. 7. What do I do about airport metal detectors? Inform the TSA you have a joint replacement.  Depending on the type of metal detector, they will most likely still wand or pat you down there is drainage; then a bandage may be placed on the incision with an ACE wrap on the knee for compression. Follow-up Information     Rocio Blunt MD In 2 weeks.     Specialties:  SURGERY, ORTHOPEDIC, Surgery, Orthopaedic  Contact information:  1 or nurse    Bring a paper prescription for each of these medications  hydrocodone-acetaminophen 7.5-325 MG Tabs           424-424-A - MAR ACTION REPORT  (last 24 hrs)    ** SITE UNKNOWN **     Order ID Medication Name Action Time Action Reason Comments CBC, PLATELET; NO DIFFERENTIAL [[id number]] (Abnormal)  Resulted: 07/12/17 0452, Result status: Final result   Ordering provider:  Elisha Dakins, MD  07/11/17 2300 Resulting lab:  [facility]    Specimen Information    Type Source Collected On   Blood — 07/1 and off for years but the last 7 months it has gotten progressively worse.   She has been taking over-the-counter nonsteroidal anti-inflammatory drugs without relief, had a left knee arthroscopy in 2008, and is now here for definitive treatment for a left t 3.   History of biliary cirrhosis. 4.   Hypertension. 5.   Hypercholesterolemia. 6.   History of atrial fibrillation presently in normal sinus rhythm. 7.   History of left leg deep venous thrombosis in 2011.  8.   Diverticulosis.   9.   Lumbar spinal st PT Discharge Recommendations: Cont skilled therapy in a supervised setting[DK. 2]    PLAN[DK. 1]  PT Treatment Plan: Transfer training;Balance training;Gait training[DK. 2]    SUBJECTIVE  I have more pain today.      OBJECTIVE       WEIGHT BEARING STATUS[DK.1] Sitting Knee Flexion  reps  15reps   Standing heel/toe raises  reps  reps   Standing knee flexion  reps  reps   Extension stretch  1x 1x     Comments: Pt participated in group session, tolerance was . Knee ROM       78 deg flex. [DK.1]          Patient E Reason for Therapy: Mobility Dysfunction and Discharge Planning    ASSESSMENT     Patient is a 80year old female admitted 7/10/2017 for L TKA.  Patient's current functional deficits include bed mobility, transfers, gait, which are below the patient's pre-a History related to current admission: Per H&P: \"Patient is admitted for left total knee arthroplasty. Patient is a 80year old female presented with a history of pain in the left knee.  Onset of symptoms was gradual starting 7 months ago with gradually wor PHYSICAL THERAPY EXAMINATION     OBJECTIVE     Fall Risk: High fall risk    WEIGHT BEARING RESTRICTION  Weight Bearing Restriction: L lower extremity           L Lower Extremity: Weight Bearing as Tolerated    PAIN ASSESSMENT  Rating: 10  Location: L knee addressed; Family present    CURRENT GOALS    Goals to be met by: 07/24/17  Patient Goal Patient's self-stated goal is: to go to [facility] AT [facility]   Goal #1 Patient is able to demonstrate supine - sit EOB @ level: modified independent     Goal #1   C transferring to toilet with use of raised seat/frame for support and with CG assist.   Pt returned to chair in room , ambulating with increased time 2/ 8/10 L knee pain and use of RW for support.    Pt remained up in chair for lunch with family present, and support and with supervision      FUNCTIONAL ADL ASSESSMENT  Grooming: supervision with set up , standing at sink for 2 min  Bathing: NT  Toileting: supervision  Upper Body Dressing: set up and supervision  Lower Body Dressing: min assist for donning socks Patient most limited by pain at this time. Was repositioned for comfort in bed. Is below baseline level of functional performance. Recommend rehab stay post discharge to promote recovery and independence. Continue acute OT to progress daily living skills. [ Patient self-stated goal is: \"I'm going to rehab. \"    OCCUPATIONAL THERAPY EXAMINATION     OBJECTIVE[MC.1]     Fall Risk: High fall risk[MC.2]    WEIGHT BEARING RESTRICTION[MC.1]  Weight Bearing Restriction: L lower extremity    L Lower Extremity: Weight Patient End of Session: In bed;Needs met;Call light within reach;RN aware of session/findings; All patient questions and concerns addressed; Family present[MC.2]    OT Goals     Patient will complete LE dressing with cga  Comment:     Patient will complete t

## (undated) NOTE — IP AVS SNAPSHOT
[facility]            (For Outpatient Use Only) Initial Admit Date: 7/10/2017   Inpt/Obs Admit Date: Inpt: 7/10/17 / Obs: N/A   Discharge Date:    Nydia Jurado:  [de-identified]   MRN: [de-identified]   CSN: [id number]        ENCOUNTER  Patient Class Hospital Account Financial Class: Medicare    July 12, 2017